# Patient Record
Sex: FEMALE | Race: OTHER | NOT HISPANIC OR LATINO
[De-identification: names, ages, dates, MRNs, and addresses within clinical notes are randomized per-mention and may not be internally consistent; named-entity substitution may affect disease eponyms.]

---

## 2023-08-18 ENCOUNTER — APPOINTMENT (OUTPATIENT)
Dept: ANTEPARTUM | Facility: CLINIC | Age: 28
End: 2023-08-18
Payer: MEDICAID

## 2023-08-18 ENCOUNTER — ASOB RESULT (OUTPATIENT)
Age: 28
End: 2023-08-18

## 2023-08-18 PROBLEM — Z00.00 ENCOUNTER FOR PREVENTIVE HEALTH EXAMINATION: Status: ACTIVE | Noted: 2023-08-18

## 2023-08-18 PROCEDURE — 76801 OB US < 14 WKS SINGLE FETUS: CPT

## 2023-08-18 PROCEDURE — 76813 OB US NUCHAL MEAS 1 GEST: CPT | Mod: 59

## 2023-10-09 ENCOUNTER — APPOINTMENT (OUTPATIENT)
Dept: ANTEPARTUM | Facility: CLINIC | Age: 28
End: 2023-10-09
Payer: MEDICAID

## 2023-10-09 ENCOUNTER — ASOB RESULT (OUTPATIENT)
Age: 28
End: 2023-10-09

## 2023-10-09 PROCEDURE — 76811 OB US DETAILED SNGL FETUS: CPT

## 2024-01-20 ENCOUNTER — NON-APPOINTMENT (OUTPATIENT)
Age: 29
End: 2024-01-20

## 2024-03-07 ENCOUNTER — INPATIENT (INPATIENT)
Facility: HOSPITAL | Age: 29
LOS: 2 days | Discharge: HOME | End: 2024-03-10
Attending: OBSTETRICS & GYNECOLOGY | Admitting: OBSTETRICS & GYNECOLOGY
Payer: MEDICAID

## 2024-03-07 DIAGNOSIS — O26.899 OTHER SPECIFIED PREGNANCY RELATED CONDITIONS, UNSPECIFIED TRIMESTER: ICD-10-CM

## 2024-03-07 DIAGNOSIS — Z34.80 ENCOUNTER FOR SUPERVISION OF OTHER NORMAL PREGNANCY, UNSPECIFIED TRIMESTER: ICD-10-CM

## 2024-03-07 RX ORDER — OXYTOCIN 10 UNIT/ML
333.33 VIAL (ML) INJECTION
Qty: 20 | Refills: 0 | Status: DISCONTINUED | OUTPATIENT
Start: 2024-03-07 | End: 2024-03-08

## 2024-03-07 RX ORDER — CHLORHEXIDINE GLUCONATE 213 G/1000ML
1 SOLUTION TOPICAL DAILY
Refills: 0 | Status: DISCONTINUED | OUTPATIENT
Start: 2024-03-07 | End: 2024-03-08

## 2024-03-07 RX ORDER — SODIUM CHLORIDE 9 MG/ML
1000 INJECTION, SOLUTION INTRAVENOUS
Refills: 0 | Status: DISCONTINUED | OUTPATIENT
Start: 2024-03-07 | End: 2024-03-08

## 2024-03-07 RX ORDER — CITRIC ACID/SODIUM CITRATE 300-500 MG
15 SOLUTION, ORAL ORAL EVERY 6 HOURS
Refills: 0 | Status: DISCONTINUED | OUTPATIENT
Start: 2024-03-07 | End: 2024-03-08

## 2024-03-07 RX ADMIN — SODIUM CHLORIDE 125 MILLILITER(S): 9 INJECTION, SOLUTION INTRAVENOUS at 23:50

## 2024-03-07 NOTE — OB PROVIDER H&P - HISTORY OF PRESENT ILLNESS
28y.o f siup at 41wks sent in for induction for postdates  +fm, no vb, no lof  ob pnc gaweda  obhx 3/2011 girl 7 pounds ft          10/2020 gril 7 pounds ft  surghx none  social no /a/d  gynhx no stds  mes pnv

## 2024-03-07 NOTE — OB PROVIDER H&P - PROBLEM SELECTOR PLAN 1
a/p 28y.o f siup at 41wks for miol  admit to   vaginal cytotec  epidural prn  d/w dr.Gaweda fernando

## 2024-03-08 VITALS
TEMPERATURE: 98 F | SYSTOLIC BLOOD PRESSURE: 112 MMHG | DIASTOLIC BLOOD PRESSURE: 69 MMHG | OXYGEN SATURATION: 98 % | HEIGHT: 62 IN | RESPIRATION RATE: 18 BRPM | WEIGHT: 199.96 LBS | HEART RATE: 89 BPM

## 2024-03-08 DIAGNOSIS — Z98.891 HISTORY OF UTERINE SCAR FROM PREVIOUS SURGERY: ICD-10-CM

## 2024-03-08 DIAGNOSIS — D64.9 ANEMIA, UNSPECIFIED: ICD-10-CM

## 2024-03-08 LAB
APTT BLD: 24.4 SEC — LOW (ref 24.5–35.6)
BASOPHILS # BLD AUTO: 0.02 K/UL — SIGNIFICANT CHANGE UP (ref 0–0.2)
BASOPHILS NFR BLD AUTO: 0.2 % — SIGNIFICANT CHANGE UP (ref 0–2)
BLD GP AB SCN SERPL QL: SIGNIFICANT CHANGE UP
EOSINOPHIL # BLD AUTO: 0.04 K/UL — SIGNIFICANT CHANGE UP (ref 0–0.5)
EOSINOPHIL NFR BLD AUTO: 0.5 % — SIGNIFICANT CHANGE UP (ref 0–6)
FIBRINOGEN PPP-MCNC: 499 MG/DL — HIGH (ref 200–475)
HCT VFR BLD CALC: 35 % — SIGNIFICANT CHANGE UP (ref 34.5–45)
HGB BLD-MCNC: 11.5 G/DL — SIGNIFICANT CHANGE UP (ref 11.5–15.5)
IMM GRANULOCYTES NFR BLD AUTO: 0.5 % — SIGNIFICANT CHANGE UP (ref 0–0.9)
INR BLD: 0.9 RATIO — SIGNIFICANT CHANGE UP (ref 0.85–1.18)
LYMPHOCYTES # BLD AUTO: 2 K/UL — SIGNIFICANT CHANGE UP (ref 1–3.3)
LYMPHOCYTES # BLD AUTO: 24.4 % — SIGNIFICANT CHANGE UP (ref 13–44)
MCHC RBC-ENTMCNC: 27.9 PG — SIGNIFICANT CHANGE UP (ref 27–34)
MCHC RBC-ENTMCNC: 32.9 GM/DL — SIGNIFICANT CHANGE UP (ref 32–36)
MCV RBC AUTO: 85 FL — SIGNIFICANT CHANGE UP (ref 80–100)
MONOCYTES # BLD AUTO: 0.8 K/UL — SIGNIFICANT CHANGE UP (ref 0–0.9)
MONOCYTES NFR BLD AUTO: 9.8 % — SIGNIFICANT CHANGE UP (ref 2–14)
NEUTROPHILS # BLD AUTO: 5.29 K/UL — SIGNIFICANT CHANGE UP (ref 1.8–7.4)
NEUTROPHILS NFR BLD AUTO: 64.6 % — SIGNIFICANT CHANGE UP (ref 43–77)
NRBC # BLD: 0 /100 WBCS — SIGNIFICANT CHANGE UP (ref 0–0)
PLATELET # BLD AUTO: 228 K/UL — SIGNIFICANT CHANGE UP (ref 150–400)
PROTHROM AB SERPL-ACNC: 10.3 SEC — SIGNIFICANT CHANGE UP (ref 9.5–13)
RBC # BLD: 4.12 M/UL — SIGNIFICANT CHANGE UP (ref 3.8–5.2)
RBC # FLD: 14.5 % — SIGNIFICANT CHANGE UP (ref 10.3–14.5)
T PALLIDUM AB TITR SER: NEGATIVE — SIGNIFICANT CHANGE UP
WBC # BLD: 8.19 K/UL — SIGNIFICANT CHANGE UP (ref 3.8–10.5)
WBC # FLD AUTO: 8.19 K/UL — SIGNIFICANT CHANGE UP (ref 3.8–10.5)

## 2024-03-08 RX ORDER — ASCORBIC ACID 60 MG
500 TABLET,CHEWABLE ORAL DAILY
Refills: 0 | Status: DISCONTINUED | OUTPATIENT
Start: 2024-03-08 | End: 2024-03-10

## 2024-03-08 RX ORDER — OXYTOCIN 10 UNIT/ML
VIAL (ML) INJECTION
Qty: 30 | Refills: 0 | Status: DISCONTINUED | OUTPATIENT
Start: 2024-03-08 | End: 2024-03-08

## 2024-03-08 RX ORDER — IBUPROFEN 200 MG
600 TABLET ORAL EVERY 6 HOURS
Refills: 0 | Status: COMPLETED | OUTPATIENT
Start: 2024-03-08 | End: 2025-02-04

## 2024-03-08 RX ORDER — FERROUS SULFATE 325(65) MG
325 TABLET ORAL DAILY
Refills: 0 | Status: DISCONTINUED | OUTPATIENT
Start: 2024-03-08 | End: 2024-03-10

## 2024-03-08 RX ORDER — OXYTOCIN 10 UNIT/ML
333.33 VIAL (ML) INJECTION
Qty: 20 | Refills: 0 | Status: DISCONTINUED | OUTPATIENT
Start: 2024-03-08 | End: 2024-03-08

## 2024-03-08 RX ORDER — KETOROLAC TROMETHAMINE 30 MG/ML
30 SYRINGE (ML) INJECTION ONCE
Refills: 0 | Status: DISCONTINUED | OUTPATIENT
Start: 2024-03-08 | End: 2024-03-08

## 2024-03-08 RX ORDER — MAGNESIUM HYDROXIDE 400 MG/1
30 TABLET, CHEWABLE ORAL
Refills: 0 | Status: DISCONTINUED | OUTPATIENT
Start: 2024-03-08 | End: 2024-03-10

## 2024-03-08 RX ORDER — PRAMOXINE HYDROCHLORIDE 150 MG/15G
1 AEROSOL, FOAM RECTAL EVERY 4 HOURS
Refills: 0 | Status: DISCONTINUED | OUTPATIENT
Start: 2024-03-08 | End: 2024-03-10

## 2024-03-08 RX ORDER — BENZOCAINE 10 %
1 GEL (GRAM) MUCOUS MEMBRANE EVERY 6 HOURS
Refills: 0 | Status: DISCONTINUED | OUTPATIENT
Start: 2024-03-08 | End: 2024-03-10

## 2024-03-08 RX ORDER — HYDROCORTISONE 1 %
1 OINTMENT (GRAM) TOPICAL EVERY 6 HOURS
Refills: 0 | Status: DISCONTINUED | OUTPATIENT
Start: 2024-03-08 | End: 2024-03-10

## 2024-03-08 RX ORDER — SIMETHICONE 80 MG/1
80 TABLET, CHEWABLE ORAL EVERY 4 HOURS
Refills: 0 | Status: DISCONTINUED | OUTPATIENT
Start: 2024-03-08 | End: 2024-03-10

## 2024-03-08 RX ORDER — ACETAMINOPHEN 500 MG
975 TABLET ORAL EVERY 6 HOURS
Refills: 0 | Status: DISCONTINUED | OUTPATIENT
Start: 2024-03-08 | End: 2024-03-10

## 2024-03-08 RX ORDER — LANOLIN
1 OINTMENT (GRAM) TOPICAL EVERY 6 HOURS
Refills: 0 | Status: DISCONTINUED | OUTPATIENT
Start: 2024-03-08 | End: 2024-03-10

## 2024-03-08 RX ORDER — OXYTOCIN 10 UNIT/ML
333.33 VIAL (ML) INJECTION
Qty: 20 | Refills: 0 | Status: COMPLETED | OUTPATIENT
Start: 2024-03-08 | End: 2024-03-08

## 2024-03-08 RX ORDER — DIPHENHYDRAMINE HCL 50 MG
25 CAPSULE ORAL EVERY 6 HOURS
Refills: 0 | Status: DISCONTINUED | OUTPATIENT
Start: 2024-03-08 | End: 2024-03-10

## 2024-03-08 RX ORDER — DIBUCAINE 1 %
1 OINTMENT (GRAM) RECTAL EVERY 6 HOURS
Refills: 0 | Status: DISCONTINUED | OUTPATIENT
Start: 2024-03-08 | End: 2024-03-10

## 2024-03-08 RX ORDER — SODIUM CHLORIDE 9 MG/ML
3 INJECTION INTRAMUSCULAR; INTRAVENOUS; SUBCUTANEOUS EVERY 8 HOURS
Refills: 0 | Status: DISCONTINUED | OUTPATIENT
Start: 2024-03-08 | End: 2024-03-10

## 2024-03-08 RX ORDER — AER TRAVELER 0.5 G/1
1 SOLUTION RECTAL; TOPICAL EVERY 4 HOURS
Refills: 0 | Status: DISCONTINUED | OUTPATIENT
Start: 2024-03-08 | End: 2024-03-10

## 2024-03-08 RX ORDER — TETANUS TOXOID, REDUCED DIPHTHERIA TOXOID AND ACELLULAR PERTUSSIS VACCINE, ADSORBED 5; 2.5; 8; 8; 2.5 [IU]/.5ML; [IU]/.5ML; UG/.5ML; UG/.5ML; UG/.5ML
0.5 SUSPENSION INTRAMUSCULAR ONCE
Refills: 0 | Status: DISCONTINUED | OUTPATIENT
Start: 2024-03-08 | End: 2024-03-10

## 2024-03-08 RX ORDER — OXYCODONE HYDROCHLORIDE 5 MG/1
5 TABLET ORAL EVERY 4 HOURS
Refills: 0 | Status: DISCONTINUED | OUTPATIENT
Start: 2024-03-08 | End: 2024-03-10

## 2024-03-08 RX ADMIN — SODIUM CHLORIDE 125 MILLILITER(S): 9 INJECTION, SOLUTION INTRAVENOUS at 13:00

## 2024-03-08 RX ADMIN — Medication 30 MILLIGRAM(S): at 23:30

## 2024-03-08 RX ADMIN — SODIUM CHLORIDE 125 MILLILITER(S): 9 INJECTION, SOLUTION INTRAVENOUS at 08:00

## 2024-03-08 RX ADMIN — Medication 2 MILLIUNIT(S)/MIN: at 04:03

## 2024-03-08 RX ADMIN — Medication 1000 MILLIUNIT(S)/MIN: at 18:35

## 2024-03-08 RX ADMIN — Medication 30 MILLIGRAM(S): at 22:30

## 2024-03-08 RX ADMIN — SODIUM CHLORIDE 3 MILLILITER(S): 9 INJECTION INTRAMUSCULAR; INTRAVENOUS; SUBCUTANEOUS at 22:30

## 2024-03-08 RX ADMIN — SODIUM CHLORIDE 125 MILLILITER(S): 9 INJECTION, SOLUTION INTRAVENOUS at 16:00

## 2024-03-08 NOTE — OB RN DELIVERY SUMMARY - NS_SEPSISRSKCALC_OBGYN_ALL_OB_FT
GBS status in the 'Prenatal Lab tests/results section' on the OB RN Patient Profile must be documented.   EOS calculated successfully. EOS Risk Factor: 0.5/1000 live births ([facility] national incidence); GA=41w;Temp=98.4; ROM=5.367; GBS='Negative'; Antibiotics='No antibiotics or any antibiotics < 2 hrs prior to birth'

## 2024-03-08 NOTE — PROGRESS NOTE ADULT - ASSESSMENT
A/P:  23yo admitted for elective induction POD #1 s/p primary c/s @ 40.1wks, chronic anemia symptomatic, pt stable  -per dr. telles offer 2u prbc transfusion, each unit to be given over 3hrs, consent obtained   -pt has very hard time tolerating po iron supplements as she has a history of constipation  -Pain management as needed  -cont post op care  -VTE prophylaxis: heparin, OOB and ambulate  - f/u Rpt CBC in am   -encourage incentive spirometer use  -Encourage breastfeeding   -d/w Dr. Telles

## 2024-03-08 NOTE — OB PROVIDER LABOR PROGRESS NOTE - NS_OBIHIFHRDETAILS_OBGYN_ALL_OB_FT
Baseline: 120 bpm  Variability: Moderate variability  Accelerations: Absent  Decelerations: Absent    Cat I tracing, non- reactive
Baseline: 120 bpm  Variability: Moderate variability  Accelerations: Present  Decelerations: Absent    Cat I tracing, reactive
.
moderate variability +accels, +variables and early decelerations
baseline 115, moderate variability, recurrent variable decels   toco q 1-2 min
cat I
Cat 1, reactive
Cat 1, reactive

## 2024-03-08 NOTE — OB PROVIDER LABOR PROGRESS NOTE - ASSESSMENT
cat I  titrate pit  epidural prn  [facility]
27yo  siuP @ 41wks, miol postdates  continue monitoring  pain mgmt- epidural   pitocin augmentation   Dr. Celeste aware  NST reviewed with Dr. Infante
Patient seen and examined  vss, normotensive , afebrile  Epidural in situ  perineal pressure  tracing category II , bbv moderate , irregular variable decelerations  contractions 1:2, spontaneous  uterus gravid, non tender  cx FD/+1, cephalic, clear fluid  IUPC in situ  a/p  41 weeks gestation ,galeano , 2nd stage of labor,   maternal status - stable clinically and hemodynamically  fetal status cat II  start active management  remove Amanda and iupc  continue FHR tracing  commence pushing    
A/P 25yo  presenting for miol for postdates. Patient is stable.  - cont close maternal and fetal monitoring  - pain management per patient request  - cont pitocin   d/w Dr. Roula jones reviewed w/ Dr. Bello Withee attending
A/P 27yo  presenting for miol for postdates. Patient is stable.  - cont close maternal and fetal monitoring  - pain management per patient request  - pitocin started at 403am  d/w Dr. Roula jones reviewed by Dr. Ace morris attending
25yo  @41wks miol for postdates, efw 3400, gbs neg  - pitocin turned off  - amnioinfusion started   - pt repositioned to left lateral decubitus with peanut ball and supplemental oxygen   - cont close maternal and fetal monitoring  - pain management in place with epidural, pt s/p top off w minimal relief   - dr. nelson aware  - reviewed with dr. moulton 
27yo  siuP @ 41wks, miol postdates  continue monitoring  pain mgmt  pitocin augmentation   Dr. Celeste aware  NST reviewed with Dr. Infante
27yo  siuP @ 41wks, miol postdates  continue monitoring  epidural reinforcement  continue pitocin augmentation   Dr. Celeste aware  NST reviewed with Dr. Infante

## 2024-03-08 NOTE — OB PROVIDER DELIVERY SUMMARY - NSPROVIDERDELIVERYNOTE_OBGYN_ALL_OB_FT
FD/+2  IUPC and Amanda removed  alive male fetus, spontaneous vaginal delivery, cephalic, STEPHANIE, nuchal cord x 1 tight/not reducible, clear fluid ,apgar 9/9,   Placenta delivered , complete looking, membranes not rugged, 3 vessel cord  vagina inspected- no cervical or high sulcus lacerations detected   anal sphincter intact  1st degree left labia minor laceration , no bleeding ,  uterus contracted , non tender  ebl 250 ml  specimen - cord blood , cord gases

## 2024-03-08 NOTE — OB RN PATIENT PROFILE - PRO HIV INFANT
What Type Of Note Output Would You Prefer (Optional)?: Bullet Format Hpi Title: Evaluation of Skin Lesions How Severe Are Your Spot(S)?: mild Have Your Spot(S) Been Treated In The Past?: has not been treated Family Member: Grandmother Additional History: Patient here for skin check last skin check January 2020 negative

## 2024-03-08 NOTE — OB PROVIDER LABOR PROGRESS NOTE - NS_SUBJECTIVE/OBJECTIVE_OBGYN_ALL_OB_FT
25yo  siup @ 41wks, miol postdates, c/o increased pain and pressure
25yo  siup @ 41wks, miol postdates  c/o increased contractions pain and requesting epidural  VSS
Patient seen and examined at bedside, offers no new complaints. Patient does not desire pain management at this time.    vss  Gen: Pt appears well, nad  Abd: Gravid, Nontender, not guarding  Ext: No edema
Patient seen and examined at bedside, offers no new complaints. Patient does not desire pain management at this time.    vss  Gen: Pt appears well, nad  Abd: Gravid, Nontender, not guarding  Ext: No edema
.
pt reports some ctxs
25yo  siup @ 41wks, miol postdates  Comfortable with epidural in place  VSS    Abd: gravid, soft, NT  Ext: soft, NT, venodynes in place  SVE /-3  AROM clr
pt c/o increased contraction pain and pelvic pressure   noted to have recurrent variable decels     iupc placed and amnioinfusion started    pitocin held   pt repositioned to left lateral decubitus position

## 2024-03-09 ENCOUNTER — TRANSCRIPTION ENCOUNTER (OUTPATIENT)
Age: 29
End: 2024-03-09

## 2024-03-09 LAB
BASOPHILS # BLD AUTO: 0.03 K/UL — SIGNIFICANT CHANGE UP (ref 0–0.2)
BASOPHILS NFR BLD AUTO: 0.3 % — SIGNIFICANT CHANGE UP (ref 0–2)
EOSINOPHIL # BLD AUTO: 0.05 K/UL — SIGNIFICANT CHANGE UP (ref 0–0.5)
EOSINOPHIL NFR BLD AUTO: 0.5 % — SIGNIFICANT CHANGE UP (ref 0–6)
HCT VFR BLD CALC: 34.4 % — LOW (ref 34.5–45)
HGB BLD-MCNC: 11.5 G/DL — SIGNIFICANT CHANGE UP (ref 11.5–15.5)
IMM GRANULOCYTES NFR BLD AUTO: 0.5 % — SIGNIFICANT CHANGE UP (ref 0–0.9)
LYMPHOCYTES # BLD AUTO: 1.96 K/UL — SIGNIFICANT CHANGE UP (ref 1–3.3)
LYMPHOCYTES # BLD AUTO: 17.7 % — SIGNIFICANT CHANGE UP (ref 13–44)
MCHC RBC-ENTMCNC: 28.4 PG — SIGNIFICANT CHANGE UP (ref 27–34)
MCHC RBC-ENTMCNC: 33.4 GM/DL — SIGNIFICANT CHANGE UP (ref 32–36)
MCV RBC AUTO: 84.9 FL — SIGNIFICANT CHANGE UP (ref 80–100)
MONOCYTES # BLD AUTO: 0.98 K/UL — HIGH (ref 0–0.9)
MONOCYTES NFR BLD AUTO: 8.9 % — SIGNIFICANT CHANGE UP (ref 2–14)
NEUTROPHILS # BLD AUTO: 7.99 K/UL — HIGH (ref 1.8–7.4)
NEUTROPHILS NFR BLD AUTO: 72.1 % — SIGNIFICANT CHANGE UP (ref 43–77)
NRBC # BLD: 0 /100 WBCS — SIGNIFICANT CHANGE UP (ref 0–0)
PLATELET # BLD AUTO: 200 K/UL — SIGNIFICANT CHANGE UP (ref 150–400)
RBC # BLD: 4.05 M/UL — SIGNIFICANT CHANGE UP (ref 3.8–5.2)
RBC # FLD: 14.5 % — SIGNIFICANT CHANGE UP (ref 10.3–14.5)
WBC # BLD: 11.06 K/UL — HIGH (ref 3.8–10.5)
WBC # FLD AUTO: 11.06 K/UL — HIGH (ref 3.8–10.5)

## 2024-03-09 RX ORDER — IBUPROFEN 200 MG
600 TABLET ORAL EVERY 6 HOURS
Refills: 0 | Status: DISCONTINUED | OUTPATIENT
Start: 2024-03-09 | End: 2024-03-10

## 2024-03-09 RX ADMIN — PRAMOXINE HYDROCHLORIDE 1 APPLICATION(S): 150 AEROSOL, FOAM RECTAL at 04:47

## 2024-03-09 RX ADMIN — Medication 500 MILLIGRAM(S): at 12:07

## 2024-03-09 RX ADMIN — SODIUM CHLORIDE 3 MILLILITER(S): 9 INJECTION INTRAMUSCULAR; INTRAVENOUS; SUBCUTANEOUS at 06:00

## 2024-03-09 RX ADMIN — Medication 1 APPLICATION(S): at 04:47

## 2024-03-09 RX ADMIN — Medication 0.5 MILLILITER(S): at 06:39

## 2024-03-09 RX ADMIN — Medication 600 MILLIGRAM(S): at 06:05

## 2024-03-09 RX ADMIN — Medication 1 APPLICATION(S): at 06:17

## 2024-03-09 RX ADMIN — Medication 600 MILLIGRAM(S): at 20:00

## 2024-03-09 RX ADMIN — SODIUM CHLORIDE 3 MILLILITER(S): 9 INJECTION INTRAMUSCULAR; INTRAVENOUS; SUBCUTANEOUS at 23:53

## 2024-03-09 RX ADMIN — Medication 325 MILLIGRAM(S): at 12:07

## 2024-03-09 RX ADMIN — Medication 600 MILLIGRAM(S): at 19:59

## 2024-03-09 RX ADMIN — Medication 600 MILLIGRAM(S): at 07:05

## 2024-03-09 NOTE — DISCHARGE NOTE OB - NS AS DC FU INST LIST INST
Patient called today. She would like a refill for the Bumex 2 mg bid # 60 refill of 3 is pending for signature.
no

## 2024-03-09 NOTE — DISCHARGE NOTE OB - PATIENT PORTAL LINK FT
You can access the FollowMyHealth Patient Portal offered by [facility] by registering at the following website: [email or web address]. By joining [employer]’s FollowMyHealth portal, you will also be able to view your health information using other applications (apps) compatible with our system.

## 2024-03-09 NOTE — DISCHARGE NOTE OB - NS OB DC PAIN RELIEF
Tylenol for minor pain as directed Tylenol for minor pain as directed/Other pain medication as prescribed and directed/Warm water sitz bath for relief from hemorrhoids

## 2024-03-09 NOTE — DISCHARGE NOTE OB - MEDICATION SUMMARY - MEDICATIONS TO TAKE
I will START or STAY ON the medications listed below when I get home from the hospital:    ibuprofen 600 mg oral tablet  -- 1 tab(s) by mouth every 6 hours as needed for  moderate pain MDD: 4  -- Indication: For moderate pain    acetaminophen 500 mg oral tablet  -- 2 tab(s) by mouth every 6 hours  -- Indication: For mild pain    Prenatal Multivitamins with Folic Acid 1 mg oral tablet  -- 1 tab(s) by mouth once a day  -- Indication: For breast feeding    docusate sodium 250 mg oral capsule  -- 1 cap(s) by mouth once a day as needed for  constipation  -- Indication: For Constipation  
Detail Level: Detailed

## 2024-03-09 NOTE — DISCHARGE NOTE OB - NS MD DC FALL RISK RISK
For information on Fall & Injury Prevention, visit: [email or web address] OR  [email or web address] OR  https://www.cdc.gov/steadi/patient.html

## 2024-03-09 NOTE — DISCHARGE NOTE OB - CARE PROVIDER_API CALL
Eber Celeste  Obstetrics and Gynecology  [address] [place] [address]  Phone: [phone]  Fax: [phone]  Follow Up Time: 1 month

## 2024-03-09 NOTE — PROGRESS NOTE ADULT - ASSESSMENT
A/P: 26y PPD#1 s/p  @ 41w. Pt stable.  -mmr postpartum  -Continue pain management  -Encourage OOB and ambulation  -Continue current care  -Plan for discharge tomorrow  -d/w Dr. Celeste

## 2024-03-10 VITALS
DIASTOLIC BLOOD PRESSURE: 84 MMHG | RESPIRATION RATE: 17 BRPM | SYSTOLIC BLOOD PRESSURE: 118 MMHG | OXYGEN SATURATION: 97 % | TEMPERATURE: 97 F | HEART RATE: 98 BPM

## 2024-03-10 PROCEDURE — 59050 FETAL MONITOR W/REPORT: CPT

## 2024-03-10 PROCEDURE — 85025 COMPLETE CBC W/AUTO DIFF WBC: CPT

## 2024-03-10 PROCEDURE — 86900 BLOOD TYPING SEROLOGIC ABO: CPT

## 2024-03-10 PROCEDURE — 86901 BLOOD TYPING SEROLOGIC RH(D): CPT

## 2024-03-10 PROCEDURE — 90707 MMR VACCINE SC: CPT

## 2024-03-10 PROCEDURE — 85384 FIBRINOGEN ACTIVITY: CPT

## 2024-03-10 PROCEDURE — 86780 TREPONEMA PALLIDUM: CPT

## 2024-03-10 PROCEDURE — 86850 RBC ANTIBODY SCREEN: CPT

## 2024-03-10 PROCEDURE — 85610 PROTHROMBIN TIME: CPT

## 2024-03-10 PROCEDURE — 85730 THROMBOPLASTIN TIME PARTIAL: CPT

## 2024-03-10 PROCEDURE — 36415 COLL VENOUS BLD VENIPUNCTURE: CPT

## 2024-03-10 RX ORDER — ACETAMINOPHEN 500 MG
2 TABLET ORAL
Qty: 80 | Refills: 0
Start: 2024-03-10 | End: 2024-03-19

## 2024-03-10 RX ORDER — DOCUSATE SODIUM 100 MG
1 CAPSULE ORAL
Qty: 14 | Refills: 0
Start: 2024-03-10 | End: 2024-03-23

## 2024-03-10 RX ORDER — IBUPROFEN 200 MG
1 TABLET ORAL
Qty: 56 | Refills: 0
Start: 2024-03-10 | End: 2024-03-23

## 2024-03-10 RX ADMIN — Medication 1 TABLET(S): at 13:24

## 2024-03-10 RX ADMIN — Medication 325 MILLIGRAM(S): at 13:24

## 2024-03-10 RX ADMIN — Medication 500 MILLIGRAM(S): at 13:23

## 2024-03-10 RX ADMIN — SODIUM CHLORIDE 3 MILLILITER(S): 9 INJECTION INTRAMUSCULAR; INTRAVENOUS; SUBCUTANEOUS at 06:01

## 2024-03-10 NOTE — PROGRESS NOTE ADULT - PROBLEM SELECTOR PLAN 1
-mmr postpartum  -Continue pain management  -Encourage OOB and ambulation  -Continue current care  -Plan for discharge tomorrow  -d/w Dr. Celeste
-plan for d/c home today with instructions  -mmr postpartum  -Continue pain management  -Encourage OOB and ambulation  -Continue current care  -d/w Dr. Celeste
A/P:  25yo admitted for elective induction POD #1 s/p primary c/s @ 40.1wks, chronic anemia symptomatic, pt stable  -per dr. telles offer 2u prbc transfusion, each unit to be given over 3hrs, consent obtained   -pt has very hard time tolerating po iron supplements as she has a history of constipation  -Pain management as needed  -cont post op care  -VTE prophylaxis: heparin, OOB and ambulate  - f/u Rpt CBC in am   -encourage incentive spirometer use  -Encourage breastfeeding   -d/w Dr. Telles

## 2024-03-10 NOTE — PROGRESS NOTE ADULT - SUBJECTIVE AND OBJECTIVE BOX
Patient seen at bedside resting comfortably offers no current complaints. Pain tolerated with medication. Ambulating and voiding without difficulty.  Passing flatus and tolerating regular diet.  Breast feeding exclusively.  Denies HA, CP, SOB, N/V/D, f/c, dizziness, palpitations, worsening abdominal pain, worsening vaginal bleeding, or any other concerns.      Vital Signs Last 24 Hrs  T(C): 36.3 (10 Mar 2024 06:00), Max: 36.7 (09 Mar 2024 18:25)  T(F): 97.3 (10 Mar 2024 06:00), Max: 98 (09 Mar 2024 18:25)  HR: 98 (10 Mar 2024 06:00) (92 - 98)  BP: 118/84 (10 Mar 2024 06:00) (112/76 - 118/84)  BP(mean): 95 (10 Mar 2024 06:00) (95 - 95)  RR: 17 (10 Mar 2024 06:00) (17 - 17)  SpO2: 97% (10 Mar 2024 06:00) (96% - 97%)    Parameters below as of 10 Mar 2024 06:00  Patient On (Oxygen Delivery Method): room air        Physical Exam:     Gen: A&Ox 3, NAD  Chest: CTA B/L  Cardiac: S1,S2; RRR  Breast: Soft, nontender, nonengorged  Abdomen: +BS; Soft, nontender, ND; Fundus firm below umbilicus  Gyn: Min lochia  Ext: Nontender, no worsening edema                          11.5   11.06 )-----------( 200      ( 09 Mar 2024 06:23 )             34.4        
Patient seen at bedside resting comfortably offers no current complaints. Ambulating and voiding without difficulty. Passing flatus and tolerating regular diet.  Both breast/bottle feeding.  at bedside. Denies HA, CP, SOB, N/V/D, f/c, dizziness, palpitations,  worsening vaginal bleeding, or any other concerns.      Vital Signs Last 24 Hrs  T(C): 36.7 (09 Mar 2024 06:25), Max: 36.9 (08 Mar 2024 19:14)  T(F): 98 (09 Mar 2024 06:25), Max: 98.4 (08 Mar 2024 19:14)  HR: 92 (09 Mar 2024 06:25) (67 - 93)  BP: 110/72 (09 Mar 2024 06:25) (107/69 - 147/63)  BP(mean): 89 (08 Mar 2024 20:14) (89 - 93)  RR: 16 (09 Mar 2024 06:25) (16 - 18)  SpO2: 96% (09 Mar 2024 06:25) (96% - 100%)    Parameters below as of 09 Mar 2024 06:25  Patient On (Oxygen Delivery Method): room air        Physical Exam:     Gen: A&Ox 3, NAD  Chest: CTA B/L  Cardiac: S1,S2; RRR  Breast: Soft, nontender, nonengorged  Abdomen: +BS, Soft, nontender, ND; Fundus firm below umbilicus  Gyn: min lochia  Ext: Nontender, no worsening edema                          11.5   11.06 )-----------( 200      ( 09 Mar 2024 06:23 )             34.4           
Patient seen at bedside resting comfortably, complaining of feeling very tired and weak. + Ambulation, + void without difficulty, no flatus; pt feels extremely constipated,  no bm; tolerating regular diet. Pt both breastfeeding and bottle feeding. Pt denies weakness, headache, blurry vision or epigastric pain, chest pain, shortness of breath, N/V/D,  dizziness, palpitations, worsening vaginal bleeding.    Vital Signs Last 24 Hrs  T(C): 36.9 (08 Mar 2024 01:24), Max: 36.9 (08 Mar 2024 01:24)  T(F): 98.4 (08 Mar 2024 01:24), Max: 98.4 (08 Mar 2024 01:24)  HR: 89 (08 Mar 2024 01:24) (89 - 89)  BP: 112/69 (08 Mar 2024 01:24) (112/69 - 112/69)  BP(mean): --  RR: 18 (08 Mar 2024 01:24) (18 - 18)  SpO2: 98% (08 Mar 2024 01:24) (98% - 98%)    Parameters below as of 08 Mar 2024 01:24  Patient On (Oxygen Delivery Method): room air        Gen: A&O x 3, NAD  Chest: CTABL  Cardiac: S1, S2, RRR  Breast: Soft, nontender, nonengorged  Abdomen: +BS; soft; Nontender, nondistended, Incision C/D/I steri strips in place   Gyn: Minimal lochia  Extremities: Nontender, DTRS 2+, no worsening edema                          11.5   8.19  )-----------( 228      ( 08 Mar 2024 00:34 )             35.0       A/P:  25yo admitted for elective induction POD #1 s/p primary c/s @ 40.1wks, chronic anemia symptomatic, pt stable  -per dr. telles offer 2u prbc transfusion, each unit to be given over 3hrs, consent obtained   -pt has very hard time tolerating po iron supplements as she has a history of constipation  -Pain management as needed  -cont post op care  -VTE prophylaxis: heparin, OOB and ambulate  - f/u Rpt CBC in am   -encourage incentive spirometer use  -Encourage breastfeeding   -d/w Dr. Telles

## 2024-03-10 NOTE — PROGRESS NOTE ADULT - ASSESSMENT
A/P: 26y PPD#1 s/p  @ 41w. Pt stable.  -plan for d/c home today with instructions  -mmr postpartum  -Continue pain management  -Encourage OOB and ambulation  -Continue current care  -d/w Dr. Celeste A/P: 26y PPD#2 s/p  @ 41w. Pt stable.  -plan for d/c home today with instructions  -mmr postpartum  -Continue pain management  -Encourage OOB and ambulation  -Continue current care  -d/w Dr. Celeste

## 2024-06-23 NOTE — OB PROVIDER IHI INDUCTION/AUGMENTATION NOTE - NSCHECKLIST_OBGYN_ALL_OB_CAL
Obtain OB provider for prenatal care, return if s/s of PTL noted  per instructions. May take Tylenol as directed per    5

## 2024-10-22 ENCOUNTER — NON-APPOINTMENT (OUTPATIENT)
Age: 29
End: 2024-10-22

## 2025-06-23 NOTE — OB PROVIDER H&P - NSINFECTIONS_OBGYN_ALL_OB
Requested Prescriptions   Pending Prescriptions Disp Refills    lisdexamfetamine (VYVANSE) 30 MG capsule 60 capsule 0     Sig: Take 1 capsule (30 mg) by mouth 2 times daily.       Rx Protocol Controlled Substance Failed - 6/23/2025  9:51 AM        Failed - Urine drug screeen results on file in past 12 months     [unfilled]           Failed - Controlled Substance Agreement on file in last 12 months     Please review last Controlled Substance Pain agreement document.   CSA -- Encounter Level on 03/31/2017:     [Media Unavailable] Controlled Substance Agreement - Scan on 4/3/2017  3:17 PM: SCHEDULED MEDICATION USE AGREEMENT       CSA -- Patient Level:    CSA: None found at the patient level.               Failed - Auto Fail - Please forward to Provider       Last Prescription Date:   6/10/2025  Last Fill Qty/Refills:         60, R-0    Last Office Visit:              5/13/2025  VV with Gloria SPAULDING 3 months  Future Office visit:           None     Dennise Dawson RN on 6/23/2025 at 9:57 AM        
Unknown at this time